# Patient Record
Sex: FEMALE | Race: WHITE
[De-identification: names, ages, dates, MRNs, and addresses within clinical notes are randomized per-mention and may not be internally consistent; named-entity substitution may affect disease eponyms.]

---

## 2021-12-03 ENCOUNTER — HOSPITAL ENCOUNTER (OUTPATIENT)
Dept: HOSPITAL 61 - SURG | Age: 86
Discharge: HOME | End: 2021-12-03
Attending: OPHTHALMOLOGY
Payer: MEDICARE

## 2021-12-03 VITALS — HEIGHT: 55 IN | WEIGHT: 175.27 LBS | BODY MASS INDEX: 40.56 KG/M2

## 2021-12-03 VITALS — DIASTOLIC BLOOD PRESSURE: 69 MMHG | SYSTOLIC BLOOD PRESSURE: 147 MMHG

## 2021-12-03 VITALS — DIASTOLIC BLOOD PRESSURE: 78 MMHG | SYSTOLIC BLOOD PRESSURE: 153 MMHG

## 2021-12-03 DIAGNOSIS — Z82.49: ICD-10-CM

## 2021-12-03 DIAGNOSIS — H25.89: Primary | ICD-10-CM

## 2021-12-03 DIAGNOSIS — M19.90: ICD-10-CM

## 2021-12-03 DIAGNOSIS — Z83.3: ICD-10-CM

## 2021-12-03 DIAGNOSIS — Z98.890: ICD-10-CM

## 2021-12-03 DIAGNOSIS — Z79.899: ICD-10-CM

## 2021-12-03 DIAGNOSIS — I10: ICD-10-CM

## 2021-12-03 DIAGNOSIS — Z72.89: ICD-10-CM

## 2021-12-03 PROCEDURE — V2632 POST CHMBR INTRAOCULAR LENS: HCPCS

## 2021-12-03 PROCEDURE — 66984 XCAPSL CTRC RMVL W/O ECP: CPT

## 2021-12-03 RX ADMIN — PHENYLEPHRINE HYDROCHLORIDE SCH DROP: 100 SOLUTION/ DROPS OPHTHALMIC at 10:11

## 2021-12-03 RX ADMIN — CYCLOPENTOLATE HYDROCHLORIDE SCH DROP: 10 SOLUTION/ DROPS OPHTHALMIC at 10:11

## 2021-12-03 RX ADMIN — PHENYLEPHRINE HYDROCHLORIDE SCH DROP: 100 SOLUTION/ DROPS OPHTHALMIC at 11:42

## 2021-12-03 RX ADMIN — CYCLOPENTOLATE HYDROCHLORIDE SCH DROP: 10 SOLUTION/ DROPS OPHTHALMIC at 10:06

## 2021-12-03 RX ADMIN — PHENYLEPHRINE HYDROCHLORIDE SCH DROP: 100 SOLUTION/ DROPS OPHTHALMIC at 10:06

## 2021-12-03 RX ADMIN — CYCLOPENTOLATE HYDROCHLORIDE SCH DROP: 10 SOLUTION/ DROPS OPHTHALMIC at 10:01

## 2021-12-03 RX ADMIN — PHENYLEPHRINE HYDROCHLORIDE SCH DROP: 100 SOLUTION/ DROPS OPHTHALMIC at 10:01

## 2021-12-03 NOTE — OP
DATE OF SURGERY: 12/03/2021

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



PROCEDURE:  Phacoemulsification with posterior chamber intraocular lens 

implantation in the right eye.



INDICATIONS:  Painless progressive visual loss and visually significant cataract

and difficulty reading and driving.



SURGEON:  Yaniv Bradford MD



ANESTHESIA:  Topical with monitored anesthesia care.



DESCRIPTION OF PROCEDURE:  The right eye was prepped with Betadine in the usual 

sterile fashion and draped.  A paracentesis was performed followed by 

instillation of preservative-free lidocaine admixed with phenylephrine and 

balanced salt solution.  A temporal clear corneal incision was made followed by 

instillation of Viscoat.  A capsulorrhexis was performed, followed by 

hydrodissection.  The phacoemulsification handpiece was used to remove the 

nucleus in a modified stop and chop fashion.  The I/A handpiece was used to 

remove the cortex.  Provisc was injected in the capsular bag and an Jose model 

[id number] with a power of 22.0 diopters was placed into the capsular bag.  Balanced

salt solution was used to hydrate the corneal wounds and the viscoelastic 

evacuated with the I/A handpiece.  Once no leak was noted, Maxitrol was placed 

on the eye and the eye shielded and the patient was sent to the recovery room 

uneventfully.







HORACIO

DR: Nelly   DD: 12/03/2021 11:57

DT: 12/03/2021 12:33   TID: [id number]